# Patient Record
Sex: FEMALE | Race: WHITE
[De-identification: names, ages, dates, MRNs, and addresses within clinical notes are randomized per-mention and may not be internally consistent; named-entity substitution may affect disease eponyms.]

---

## 2019-08-13 ENCOUNTER — HOSPITAL ENCOUNTER (EMERGENCY)
Dept: HOSPITAL 50 - VM.ED | Age: 79
Discharge: HOME | End: 2019-08-13
Payer: MEDICARE

## 2019-08-13 DIAGNOSIS — Z79.84: ICD-10-CM

## 2019-08-13 DIAGNOSIS — Z79.899: ICD-10-CM

## 2019-08-13 DIAGNOSIS — S73.014A: Primary | ICD-10-CM

## 2019-08-13 DIAGNOSIS — Z79.82: ICD-10-CM
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
## 2019-08-13 PROCEDURE — 96376 TX/PRO/DX INJ SAME DRUG ADON: CPT

## 2019-08-13 PROCEDURE — 96374 THER/PROPH/DIAG INJ IV PUSH: CPT

## 2019-08-13 PROCEDURE — 96375 TX/PRO/DX INJ NEW DRUG ADDON: CPT

## 2019-08-13 PROCEDURE — 72192 CT PELVIS W/O DYE: CPT

## 2019-08-13 PROCEDURE — 99284 EMERGENCY DEPT VISIT MOD MDM: CPT

## 2019-08-13 PROCEDURE — 99285 EMERGENCY DEPT VISIT HI MDM: CPT

## 2019-08-13 PROCEDURE — 96361 HYDRATE IV INFUSION ADD-ON: CPT

## 2019-08-13 NOTE — CT
______________________________________________________________________________   

  

[id number] CT/CT Pelvis WO IV  

Exam:   

   

 CT Pelvis WO IV  

   

 Clinical Data:   

   

 DISLOCATED RIGHT HIP PROSTHESIS  

   

 COMPARISON:   

   

 NO PREVIOUS SIMILAR EXAM IS AVAILABLE  

   

 FINDINGS:   

   

 There is posterior dislocation of the femoral   

   

 component of the right hip prosthesis.  

   

 No fracture is seen.   

   

 IMPRESSION:  

   

 POSTERIOR DISLOCATION OF RIGHT HIP PROSTHESIS.  

   

 Electronically signed by Juan Manuel Ochoa MD on 8/13/2019 3:07 PM  

   

  

Juan Manuel Ochoa MD                 

 08/13/19 2843    

  

Thank you for allowing us to participate in the care of your patient.

## 2019-08-13 NOTE — EDM.PDOC
ED HPI GENERAL MEDICAL PROBLEM





- General


Chief Complaint: Lower Extremity Injury/Pain


Stated Complaint: right hip dislocation


Time Seen by Provider: 08/13/19 14:07


Source of Information: Reports: Patient, EMS


History Limitations: Reports: No Limitations





- History of Present Illness


INITIAL COMMENTS - FREE TEXT/NARRATIVE: 


Patient reports bending over at her vanity to get medications for her .  

In doing so she felt her right hip pop.  She does have history of right hip 

replacement around 2010.  She denies dizziness, did not pass out, did not hit 

her head.  No nausea/vomiting, no muscle cramps, no numbness or tingling to the 

extremity.  Full rom to all other extremities.


Onset: Today, Sudden


Duration: Intermittent


Location: Reports: Lower Extremity, Right


Quality: Reports: Sharp, Stabbing


Severity: Moderate


Improves with: Reports: Rest


Worsens with: Reports: Movement


  ** Right Hip


Pain Score (Numeric/FACES): 5





- Related Data


 Allergies











Allergy/AdvReac Type Severity Reaction Status Date / Time


 


No Known Drug Allergies Allergy  Other Verified 08/13/19 15:33











Home Meds: 


 Home Meds





Calcium Carbonate/Vitamin D2 [Calcium with Vit D] 1 tab PO DAILY 09/12/14 [

History]


Denosumab [Prolia] 1 ml SQ .Q6MONTHS 09/12/14 [History]


Multivitamin [Multivitamins] 1 tab PO DAILY 09/12/14 [History]


atorvaSTATin [Lipitor] 10 mg PO BEDTIME 09/12/14 [History]


Aspirin 81 mg PO DAILY 08/13/19 [History]


metFORMIN [Glucophage XR] 500 mg PO DAILY 08/13/19 [History]











Review of Systems





- Review of Systems


Review Of Systems: See Below


Constitutional: Reports: No Symptoms


Eyes: Reports: No Symptoms


Ears: Reports: No Symptoms


Nose: Reports: No Symptoms


Mouth/Throat: Reports: No Symptoms


Respiratory: Reports: No Symptoms


Cardiovascular: Reports: No Symptoms


GI/Abdominal: Reports: No Symptoms


Genitourinary: Reports: No Symptoms


Musculoskeletal: Reports: Leg Pain (right hip pain)


Skin: Reports: No Symptoms


Neurological: Reports: No Symptoms


Psychiatric: Reports: No Symptoms





ED EXAM, GENERAL





- Physical Exam


Exam: See Below


Exam Limited By: No Limitations


General Appearance: Alert, WD/WN, Mild Distress


Eye Exam: Bilateral Eye: EOMI, Normal Inspection


Ears: Normal TMs


Nose: Normal Inspection, Normal Mucosa, No Blood


Throat/Mouth: Normal Inspection, Normal Lips, Normal Teeth, Normal Gums, Normal 

Oropharynx, Normal Voice, No Airway Compromise


Head: Atraumatic, Normocephalic


Neck: Normal Inspection, Supple, Non-Tender, Full Range of Motion


Respiratory/Chest: No Respiratory Distress, Lungs Clear, Normal Breath Sounds, 

No Accessory Muscle Use, Chest Non-Tender


Cardiovascular: Normal Peripheral Pulses, Regular Rate, Rhythm, No Edema, No 

Gallop, No JVD, No Murmur, No Rub


Peripheral Pulses: 2+: Posterior Tibial (L), Posterior Tibial (R), Dorsalis 

Pedis (L), Dorsalis Pedis (R)


GI/Abdominal: Normal Bowel Sounds, Soft, Non-Tender, No Organomegaly, No 

Distention, No Abnormal Bruit, No Mass


Back Exam: Normal Inspection, Full Range of Motion, NT


Extremities: Limited Range of Motion (right leg has shortening and rotation at 

the hip/pelvis.  Any rotation of limb elicits pain)


Neurological: Alert, Oriented, CN II-XII Intact, Normal Cognition





Course





- Vital Signs


Last Recorded V/S: 


 Last Vital Signs











Temp  37.1 C   08/13/19 15:54


 


Pulse  73   08/13/19 15:54


 


Resp  16   08/13/19 15:54


 


BP  185/75 H  08/13/19 15:54


 


Pulse Ox  98   08/13/19 15:54














- Orders/Labs/Meds


Orders: 


 Active Orders 24 hr











 Category Date Time Status


 


 Saline Lock Insert [OM.PC] Routine Oth  08/13/19 14:14 Ordered











Meds: 


Medications














Discontinued Medications














Generic Name Dose Route Start Last Admin





  Trade Name Geno  PRN Reason Stop Dose Admin


 


Hydromorphone HCl  1 mg  08/13/19 15:54  08/13/19 15:58





  Dilaudid  IVPUSH  08/13/19 15:55  1 mg





  ONETIME ONE   Administration





     





     





     





     


 


Sodium Chloride  1,000 mls @ 125 mls/hr  08/13/19 14:16  08/13/19 14:22





  Normal Saline  IV  08/13/19 22:15  125 mls/hr





  ONETIME ONE   Administration





     





     





     





     


 


Morphine Sulfate  4 mg  08/13/19 14:14  08/13/19 15:19





  Morphine  IVPUSH  08/13/19 14:15  4 mg





  ONETIME ONE   Administration





     





     





     





     


 


Morphine Sulfate  4 mg  08/13/19 15:09  08/13/19 14:26





  Morphine  IVPUSH  08/13/19 15:10  4 mg





  ONETIME ONE   Administration





     





     





     





     


 


Ondansetron HCl  4 mg  08/13/19 14:14  08/13/19 14:24





  Zofran  IVPUSH  08/13/19 14:15  4 mg





  ONETIME ONE   Administration





     





     





     





     


 


Sodium Chloride  10 ml  08/13/19 14:14  





  Saline Flush  FLUSH   





  ASDIRECTED PRN   





  Keep Vein Open   





     





     





     














- Radiology Interpretation


Free Text/Narrative:: 





CT scan shows posterior dislocation of the right hip prosthesis





Departure





- Departure


Time of Disposition: 16:05


Disposition: Home, Self-Care 01


Condition: Good


Clinical Impression: 


 Posterior dislocation of right hip, initial encounter








- Discharge Information


*PRESCRIPTION DRUG MONITORING PROGRAM REVIEWED*: No


*COPY OF PRESCRIPTION DRUG MONITORING REPORT IN PATIENT SUSAN: No


Referrals: 


Maggie Rodríguez MD [Primary Care Provider] - 


Forms:  ED Department Discharge, Interfacility Transfer [facility]





ED Communication





- ED Communication Date/Time


Date: 08/13/19


Time Called: 15:23





- Discussed Case With (1)


Discussed Case With (1): Admitting Provider (ED provider Dr. Adorno contacted, 

given report and he will accept care of patient in transfer.)





- Problem List & Annotations


(1) Posterior dislocation of right hip, initial encounter


SNOMED Code(s): 875479935


   Code(s): S73.014A - POSTERIOR DISLOCATION OF RIGHT HIP, INITIAL ENCOUNTER   

Status: Acute   Priority: Medium   





- Problem List Review


Problem List Initiated/Reviewed/Updated: Yes





- My Orders


Last 24 Hours: 


My Active Orders





08/13/19 14:14


Saline Lock Insert [OM.PC] Routine 














- Assessment/Plan


Last 24 Hours: 


My Active Orders





08/13/19 14:14


Saline Lock Insert [OM.PC] Routine 











Assessment:: 





posterior dislocation of right hip prosthesis


Plan: 


Transfer to [place] ED

## 2021-05-07 ENCOUNTER — HOSPITAL ENCOUNTER (OUTPATIENT)
Dept: HOSPITAL 50 - VM.SDS | Age: 81
Discharge: HOME | End: 2021-05-07
Attending: SURGERY
Payer: MEDICARE

## 2021-05-07 DIAGNOSIS — D12.0: Primary | ICD-10-CM

## 2021-05-07 DIAGNOSIS — Z79.899: ICD-10-CM

## 2021-05-07 DIAGNOSIS — M81.0: ICD-10-CM

## 2021-05-07 DIAGNOSIS — E11.9: ICD-10-CM

## 2021-05-07 DIAGNOSIS — I10: ICD-10-CM

## 2021-05-07 DIAGNOSIS — E78.5: ICD-10-CM

## 2021-05-07 DIAGNOSIS — Z20.822: ICD-10-CM

## 2021-05-07 DIAGNOSIS — Z79.84: ICD-10-CM

## 2021-05-07 DIAGNOSIS — Z90.49: ICD-10-CM

## 2021-05-07 DIAGNOSIS — Z98.890: ICD-10-CM

## 2021-05-07 DIAGNOSIS — I25.10: ICD-10-CM

## 2021-05-07 DIAGNOSIS — E66.3: ICD-10-CM

## 2021-05-07 PROCEDURE — U0002 COVID-19 LAB TEST NON-CDC: HCPCS

## 2021-05-07 NOTE — OR
PREOPERATIVE DIAGNOSIS:  Positive Cologuard.

 

POSTOPERATIVE DIAGNOSIS:  Colon polyps.

 

PROCEDURE PERFORMED:  Total flexible colonoscopy with biopsies.

 

ANESTHESIA:  MAC anesthesia.

 

COMPLICATIONS:  None.

 

BLOOD LOSS:  Minimal.

 

FINDINGS:

1. Cecal polyps x4, 1-3 mm, cold snare and cold forceps.

2. Large flat cecal polyp, unable to be endoscopically resected, tattoo

    placed.

 

START TIME:  0916.

 

CECUM TIME:  0934.

 

STOP TIME:  1006.

 

BOWEL PREP:  [place] class 2.

 

INDICATIONS FOR PROCEDURE:  Trini Paul is an 80-year-old female who has

had colonoscopy 10 years ago.  She recently had a positive Cologuard.  Notably,

her Cologuard was negative 3 years ago.  She is here for colonoscopy.

 

DETAILS OF PROCEDURE:  Informed consent was obtained.  The patient was placed in

left lateral decubitus position.  MAC anesthesia was induced by Anesthesia

colleagues.  The colonoscope was introduced into the rectum and advanced all the

way to the cecum.  She did have a somewhat tortuous sigmoid, which took longer

than average to traverse.  The appendiceal orifice was photographed.  Terminal

ileum was intubated and photographed.  No pathology was identified except for

what is mentioned in the findings section.  Notably, the large flat cecal polyp

was draped over a fold.  We attempted saline lift, but we could not get to lift

adequately in order to attempt a complete resection.  It was quite large and I

was actually skeptical that we would be able to get this out, but we felt it was

prudent to attempt to lift this.  I did place a tattoo just distal to this

polyp.  Retroflexed view was obtained and the colonoscope was removed.  The

patient tolerated the procedure well, was awoken from anesthesia by Anesthesia

colleagues without incident.

 

PATHOLOGY:

A) Colon, cecal polyps, polypectomy

   Tubular adenomata.

B) Colon, cecum, large polyp biopsy

   Tubulovillous adenoma.

 

Recommendations: I discussed the pathology report with this patient over the 
phone.  I discussed that she had a large cecal polyp which was not amenable to 
endoscopic resection.  I offered her 2 choices.  First, a referral to GI for an 
attempt at endoscopic resection.  I discussed that they have greater expertise 
which may allow them to successfully remove that polyp.  Second, I offered her 
surgical excision of that portion of her colon which would be a right 
hemicolectomy.  I expect this could be done laparoscopically.  She was undecided
on what she wanted to do.  She would like some time to think about it.  I will 
have my office reach out to her tomorrow and make sure she has all the necessary
information to contact me with her decision.  I would be happy to see her in 
clinic for right hemicolectomy or place a referral for a second attempt at 
endoscopic resection by a GI specialist.

 

 

RKM:  05/07/2021 10:24:45  MODL:  05/07/2021 10:52:01

Job #:  [id number]

[email or web address]

## 2021-05-13 NOTE — LETTER
2021

 

Ms. Trini Lundberg Yonathan

[id number] [place] [address]

 

RE:  TRINI LUNDBERG YONATHAN :  [id number]

 

Dear Ms. Paul:

 

I am writing to inform you of the pathology report of your recent colonoscopy.

You had 4 tubular adenomas removed.  A tubular adenoma is a polyp, which is

considered precancerous, but does not contain cancer.  We removed them to

prevent the development of cancer cells.  You also had a large polyp in your

cecum, which was not amenable to endoscopic resection.  As we discussed over the

phone, this can be removed with surgery by removing the right-sided portion of

your colon.  The other option would be a referral to a GI specialist in [place],

who may be able to get that polyp removed with the colonoscope.  You indicated

that you are undecided and I will await your decision and help to facilitate

whichever you should choose.

 

Warmest regards,